# Patient Record
Sex: FEMALE | Race: WHITE | URBAN - METROPOLITAN AREA
[De-identification: names, ages, dates, MRNs, and addresses within clinical notes are randomized per-mention and may not be internally consistent; named-entity substitution may affect disease eponyms.]

---

## 2021-12-01 NOTE — TELEPHONE ENCOUNTER
Patient scheduled 12/22 with Dr Yvonne Calhoun (previous patient). She left a message regarding a fax she had sent over for a Progesterone prescription refill / Caden Ley (faxed received placed in bin at ). Please advise, Thank you!

## 2021-12-02 NOTE — TELEPHONE ENCOUNTER
I am ok with filling the progesterone. Please provide the dose, quantity and instructions. If needed reach out to [facility]. Let me know when this is done.

## 2021-12-22 ENCOUNTER — OFFICE VISIT (OUTPATIENT)
Dept: INTEGRATIVE MEDICINE | Facility: CLINIC | Age: 66
End: 2021-12-22
Payer: MEDICARE

## 2021-12-22 VITALS
BODY MASS INDEX: 27.79 KG/M2 | WEIGHT: 151 LBS | DIASTOLIC BLOOD PRESSURE: 68 MMHG | OXYGEN SATURATION: 95 % | HEIGHT: 62 IN | SYSTOLIC BLOOD PRESSURE: 122 MMHG | HEART RATE: 74 BPM

## 2021-12-22 DIAGNOSIS — Z78.0 MENOPAUSE: Primary | ICD-10-CM

## 2021-12-22 PROCEDURE — 99214 OFFICE O/P EST MOD 30 MIN: CPT | Performed by: FAMILY MEDICINE

## 2021-12-22 RX ORDER — ESTRADIOL 0.1 MG/G
1 CREAM VAGINAL DAILY
COMMUNITY
Start: 2021-07-21

## 2021-12-22 RX ORDER — UBIDECARENONE 10 MG
100 CAPSULE ORAL DAILY
COMMUNITY

## 2021-12-22 RX ORDER — CYCLOBENZAPRINE HCL 10 MG
1 TABLET ORAL EVERY 8 HOURS PRN
COMMUNITY
Start: 2019-11-22

## 2021-12-22 RX ORDER — ATORVASTATIN CALCIUM 10 MG/1
1 TABLET, FILM COATED ORAL DAILY
COMMUNITY
Start: 2021-12-08

## 2021-12-22 NOTE — PATIENT INSTRUCTIONS
BHRT:     Step down dose:   E2/E3/Testosterone 0.5mg/0.5 mg/2 mg/ml - 0.5 ml per day   Progesterone 100 mg SR nighlty     Supplement/Nutrient Support:    Estro Vive       Estro Vive - May use as needed with hormone taper   Dose: Take 2 capsule daily Additional Information:               Hormone Therapy for Women  Hormone therapy (HT) increases the levels of the hormones estrogen and progesterone in your body. This can help reduce symptoms of menopause.  HT may also help prevent osteoporosis muscles in your back or legs  · Sudden pain in your legs or chest  · Shortness of breath  [employer] last reviewed this educational content on 2/1/2020  © 1881-3632 [employer]. All rights reserved.  This information is not intended as a substit

## 2021-12-22 NOTE — PROGRESS NOTES
Rehan Persaud is a 73year old female.   Patient presents with:  Establish Care: previous pt  - discuss bioidenticals        HPI:   Here to establish care  Exercising 5 days a week and feeling better overall   Wishes to preform diet for \"Eat Fat get T Diagnosis Date   • Depression 2007    Resolved       CURRENT MEDICATIONS:     Current Outpatient Medications   Medication Sig Dispense Refill   • atorvastatin 10 MG Oral Tab Take 1 tablet by mouth daily.      • Cholecalciferol 50 MCG (2000 UT) Oral Tab Ta file  Stress: Not on file  Social Connections: Not on file  Intimate Partner Violence: Not on file  Housing Stability: Not on file    SURGICAL HISTORY:     Past Surgical History:   Procedure Laterality Date   • Adenoidectomy  1968   • Cholecystectomy  12/2 dose:  E2/E3/testosterone 0.5 mg / 0.5 mg/2 mg/milliliter–0.5 mL daily for 1 month  Progesterone 100 mg SR nightly for 1 month  Estrovive PRN if experiencing hot flashes with taper    May start eat fat get thin diet by Dr. Lakisha Aguirre     Given further recommend limited to its efficacy, benefits or outcomes. Patient agrees to contact his/her healthcare professional and stop use of Products should any reactions arise.       Diet Recommendations:     Eat Fat Get Thin Protocol       Lifestyle Recommendations:     Rec Follow-up visits  Have regular visits with a healthcare provider. These visits are a way to fine-tune your therapy. You can also be checked for any problems that might require you to stop HT.      When to call your healthcare provider  Call your healthcar

## 2022-01-03 ENCOUNTER — TELEPHONE (OUTPATIENT)
Dept: INTEGRATIVE MEDICINE | Facility: CLINIC | Age: 67
End: 2022-01-03

## 2022-01-03 DIAGNOSIS — E78.5 HYPERLIPIDEMIA, UNSPECIFIED HYPERLIPIDEMIA TYPE: Primary | ICD-10-CM

## 2022-01-03 NOTE — TELEPHONE ENCOUNTER
Patient attempting to complete the CRP Lab - Medicare stated they would not cover with current diagnosis code, wondering if there was another code available.

## 2022-01-11 ENCOUNTER — TELEPHONE (OUTPATIENT)
Dept: INTEGRATIVE MEDICINE | Facility: CLINIC | Age: 67
End: 2022-01-11

## 2022-01-11 NOTE — TELEPHONE ENCOUNTER
Jose Manuel pt's lab results from: [address] [place] completed: 1/8/2022  Ordered: 1/3/2022  Due: 1/3/2022  Report placed on provider's desk for review.

## 2022-01-14 ENCOUNTER — MED REC SCAN ONLY (OUTPATIENT)
Dept: INTEGRATIVE MEDICINE | Facility: CLINIC | Age: 67
End: 2022-01-14

## 2022-02-17 ENCOUNTER — TELEPHONE (OUTPATIENT)
Dept: INTEGRATIVE MEDICINE | Facility: CLINIC | Age: 67
End: 2022-02-17

## 2022-02-17 RX ORDER — PROGESTERONE 100 MG/1
100 CAPSULE ORAL NIGHTLY
Qty: 30 CAPSULE | Refills: 2 | Status: SHIPPED | OUTPATIENT
Start: 2022-02-17

## 2022-02-17 NOTE — TELEPHONE ENCOUNTER
Patient is inquiring about her progesterone medication, she would like it to be filled at [address], [address] She is also going out of town and will need multiple refills.  Please advise, Thank you

## 2022-02-21 NOTE — TELEPHONE ENCOUNTER
Pt called to follow up and also sent MyChart msg on the status of E2E3 Testosterone cream. Pt stated she requested the refill 2 weeks ago. Need to hear back today.

## 2022-03-31 ENCOUNTER — OFFICE VISIT (OUTPATIENT)
Dept: INTEGRATIVE MEDICINE | Facility: CLINIC | Age: 67
End: 2022-03-31
Payer: MEDICARE

## 2022-03-31 VITALS
WEIGHT: 138.19 LBS | DIASTOLIC BLOOD PRESSURE: 68 MMHG | HEIGHT: 62 IN | HEART RATE: 78 BPM | BODY MASS INDEX: 25.43 KG/M2 | OXYGEN SATURATION: 97 % | SYSTOLIC BLOOD PRESSURE: 124 MMHG

## 2022-03-31 DIAGNOSIS — Z78.0 MENOPAUSE: Primary | ICD-10-CM

## 2022-03-31 DIAGNOSIS — Z00.00 WELL ADULT EXAM: ICD-10-CM

## 2022-03-31 DIAGNOSIS — R79.82 ELEVATED C-REACTIVE PROTEIN (CRP): ICD-10-CM

## 2022-03-31 DIAGNOSIS — E78.5 HYPERLIPIDEMIA, UNSPECIFIED HYPERLIPIDEMIA TYPE: ICD-10-CM

## 2022-03-31 PROCEDURE — 99214 OFFICE O/P EST MOD 30 MIN: CPT | Performed by: FAMILY MEDICINE

## 2022-07-12 RX ORDER — PROGESTERONE 100 MG/1
CAPSULE ORAL
Qty: 30 CAPSULE | Refills: 0 | Status: SHIPPED | OUTPATIENT
Start: 2022-07-12

## 2022-08-22 ENCOUNTER — TELEPHONE (OUTPATIENT)
Dept: INTEGRATIVE MEDICINE | Facility: CLINIC | Age: 67
End: 2022-08-22

## 2022-08-22 ENCOUNTER — MED REC SCAN ONLY (OUTPATIENT)
Dept: INTEGRATIVE MEDICINE | Facility: CLINIC | Age: 67
End: 2022-08-22

## 2022-08-22 PROBLEM — N95.2 VAGINAL ATROPHY: Status: ACTIVE | Noted: 2019-07-18

## 2022-08-22 PROBLEM — R94.31 ABNORMAL EKG: Status: ACTIVE | Noted: 2020-09-03

## 2022-08-22 PROBLEM — E78.49 OTHER HYPERLIPIDEMIA: Status: ACTIVE | Noted: 2018-04-06

## 2022-08-22 RX ORDER — TESTOSTERONE MICRONIZED 100 %
POWDER (GRAM) MISCELLANEOUS
COMMUNITY
Start: 2022-08-12

## 2022-08-22 NOTE — TELEPHONE ENCOUNTER
Patient contacted clinic regarding mammogram, showed area of suspicion, had this completed at [facility]. They will be faxing over results, advised she will need to also obtain a 'special view of mammogram' and 'ultrasound right breast'. She will be travelling out of town in 2 weeks, wondering if this can be ordered prior to.     Thank you

## 2022-08-25 RX ORDER — PROGESTERONE 100 MG/1
CAPSULE ORAL
Qty: 30 CAPSULE | Refills: 0 | Status: SHIPPED | OUTPATIENT
Start: 2022-08-25

## 2022-09-17 ENCOUNTER — PATIENT MESSAGE (OUTPATIENT)
Dept: INTEGRATIVE MEDICINE | Facility: CLINIC | Age: 67
End: 2022-09-17

## 2022-09-19 NOTE — TELEPHONE ENCOUNTER
Pt sending Ally chavez re: lab orders not being sent to [facility] yet for her lab draw that is occurring tomorrow 9/20/22.     Lab orders (CMP, CBC, Lipids, TSH/Reflex T4, CRP) faxed this am to [facility] ([phone])

## 2022-09-20 ENCOUNTER — OFFICE VISIT (OUTPATIENT)
Dept: INTEGRATIVE MEDICINE | Facility: CLINIC | Age: 67
End: 2022-09-20

## 2022-09-20 VITALS
SYSTOLIC BLOOD PRESSURE: 112 MMHG | DIASTOLIC BLOOD PRESSURE: 62 MMHG | HEART RATE: 79 BPM | OXYGEN SATURATION: 96 % | BODY MASS INDEX: 24 KG/M2 | WEIGHT: 130 LBS

## 2022-09-20 DIAGNOSIS — E78.49 OTHER HYPERLIPIDEMIA: ICD-10-CM

## 2022-09-20 DIAGNOSIS — N95.2 VAGINAL ATROPHY: ICD-10-CM

## 2022-09-20 DIAGNOSIS — F32.A DEPRESSION, UNSPECIFIED DEPRESSION TYPE: ICD-10-CM

## 2022-09-20 DIAGNOSIS — M85.89 OSTEOPENIA OF MULTIPLE SITES: ICD-10-CM

## 2022-09-20 DIAGNOSIS — R94.31 ABNORMAL EKG: ICD-10-CM

## 2022-09-20 DIAGNOSIS — Z00.00 ENCOUNTER FOR ANNUAL HEALTH EXAMINATION: Primary | ICD-10-CM

## 2022-09-20 PROCEDURE — G0438 PPPS, INITIAL VISIT: HCPCS | Performed by: FAMILY MEDICINE

## 2022-09-20 RX ORDER — TRIAMCINOLONE ACETONIDE 1 MG/G
CREAM TOPICAL
COMMUNITY
Start: 2022-08-31

## 2022-09-26 ENCOUNTER — MED REC SCAN ONLY (OUTPATIENT)
Dept: INTEGRATIVE MEDICINE | Facility: CLINIC | Age: 67
End: 2022-09-26

## 2022-09-26 ENCOUNTER — TELEPHONE (OUTPATIENT)
Dept: INTEGRATIVE MEDICINE | Facility: CLINIC | Age: 67
End: 2022-09-26

## 2022-09-28 RX ORDER — PROGESTERONE 100 MG/1
CAPSULE ORAL
Qty: 30 CAPSULE | Refills: 0 | Status: SHIPPED | OUTPATIENT
Start: 2022-09-28

## 2022-10-28 ENCOUNTER — TELEPHONE (OUTPATIENT)
Dept: INTEGRATIVE MEDICINE | Facility: CLINIC | Age: 67
End: 2022-10-28

## 2022-10-28 NOTE — TELEPHONE ENCOUNTER
Pt called again to request refill for Progesterone 100 MG. Pt states she will be out of town next week but if possible she will call Monday 10/31 to give pharmacy details in [id number] [facility] so Rx can be sent to the pharmacy there so that way she does not have to be without taking her med.

## 2022-10-31 ENCOUNTER — TELEPHONE (OUTPATIENT)
Dept: INTEGRATIVE MEDICINE | Facility: CLINIC | Age: 67
End: 2022-10-31

## 2022-10-31 NOTE — TELEPHONE ENCOUNTER
Patient is now in [place] and is out of medication. Please submit prescription to the following pharmacy; [facility]#[phone]  FAX#?      Thank you

## 2022-11-02 RX ORDER — PROGESTERONE 100 MG/1
100 CAPSULE ORAL NIGHTLY
Qty: 30 CAPSULE | Refills: 0 | Status: SHIPPED | OUTPATIENT
Start: 2022-11-02

## 2022-11-04 NOTE — TELEPHONE ENCOUNTER
Please advise patient about medication dosage after [place] test results. Patient has an appointment next March, sooner than next opening, please advise if that appointment is ok for follow up.

## 2022-12-09 RX ORDER — ESTRADIOL 0.1 MG/G
1 CREAM VAGINAL DAILY
Qty: 42.5 G | Refills: 0 | Status: SHIPPED | OUTPATIENT
Start: 2022-12-09

## 2023-01-11 RX ORDER — ATORVASTATIN CALCIUM 10 MG/1
10 TABLET, FILM COATED ORAL DAILY
Qty: 90 TABLET | Refills: 0 | Status: SHIPPED | OUTPATIENT
Start: 2023-01-11

## 2023-02-25 NOTE — TELEPHONE ENCOUNTER
Patient is requesting refill on Progesterone 100 mg. Patient's last refill was on 11/29/2022 and last office visit was on 9/22/2022. Did not pass protocol. Please advise.

## 2023-02-27 RX ORDER — PROGESTERONE 100 MG/1
CAPSULE ORAL
Qty: 30 CAPSULE | Refills: 0 | Status: SHIPPED | OUTPATIENT
Start: 2023-02-27

## 2023-03-06 ENCOUNTER — OFFICE VISIT (OUTPATIENT)
Dept: INTEGRATIVE MEDICINE | Facility: CLINIC | Age: 68
End: 2023-03-06
Payer: MEDICARE

## 2023-03-06 VITALS
OXYGEN SATURATION: 99 % | HEART RATE: 63 BPM | SYSTOLIC BLOOD PRESSURE: 100 MMHG | DIASTOLIC BLOOD PRESSURE: 70 MMHG | BODY MASS INDEX: 24 KG/M2 | WEIGHT: 129.63 LBS

## 2023-03-06 DIAGNOSIS — Z79.890 HORMONE REPLACEMENT THERAPY: ICD-10-CM

## 2023-03-06 DIAGNOSIS — Z78.0 MENOPAUSE: ICD-10-CM

## 2023-03-06 DIAGNOSIS — M77.8 RIGHT SHOULDER TENDONITIS: Primary | ICD-10-CM

## 2023-03-06 DIAGNOSIS — N91.2 AMENORRHEA: ICD-10-CM

## 2023-03-06 PROCEDURE — 99213 OFFICE O/P EST LOW 20 MIN: CPT | Performed by: FAMILY MEDICINE

## 2023-04-27 ENCOUNTER — PATIENT MESSAGE (OUTPATIENT)
Dept: INTEGRATIVE MEDICINE | Facility: CLINIC | Age: 68
End: 2023-04-27

## 2023-04-28 NOTE — TELEPHONE ENCOUNTER
RN s/w patient. Patient upset- states she has not been able to get refill of Sig: E2/E3/Testosterone 0.5 mg/0.5mg/2mg/ml   Dose: Take 0.5 ml daily for 1 month. RN spoke with Carissa Caldera, [address] [place] [address] per Carissa Caldera, script was faxed to [facility] yesterday. Patient is very upset, states this is \"unacceptable medical care\"     RN advised patient that she can call the RN triage line at the office  if any refill is needed urgently for the future. Patient would like to know if there are any special considerations of being off of hormone cream/implications? Will forward to Dr. Krupa Arboleda.

## 2023-05-22 ENCOUNTER — MED REC SCAN ONLY (OUTPATIENT)
Dept: INTEGRATIVE MEDICINE | Facility: CLINIC | Age: 68
End: 2023-05-22

## 2023-06-15 NOTE — TELEPHONE ENCOUNTER
A refill request was received for:  Requested Prescriptions     Pending Prescriptions Disp Refills    PROGESTERONE 100 MG Oral Cap [Pharmacy Med Name: Progesterone 100 Mg Cap Nort] 30 capsule 0     Sig: Take 1 capsule (100 mg total) by mouth every evening.      Last refill date:  04/20/2023  Qty: 30  Dx:   Last office visit: 03/06/2023   When is follow up due: 09/06/2023 6 months per last OV      For hormone prescriptions, date of last blood test for rx being requested:    Future Appointments   Date Time Provider Bhanu Brooke   9/7/2023  2:30 PM Vanessa Alvarez [facility] [place]

## 2023-06-16 RX ORDER — PROGESTERONE 100 MG/1
100 CAPSULE ORAL EVERY EVENING
Qty: 30 CAPSULE | Refills: 0 | Status: SHIPPED | OUTPATIENT
Start: 2023-06-16

## 2023-06-21 ENCOUNTER — MED REC SCAN ONLY (OUTPATIENT)
Dept: INTEGRATIVE MEDICINE | Facility: CLINIC | Age: 68
End: 2023-06-21

## 2023-07-17 ENCOUNTER — PATIENT MESSAGE (OUTPATIENT)
Dept: INTEGRATIVE MEDICINE | Facility: CLINIC | Age: 68
End: 2023-07-17

## 2023-07-25 RX ORDER — PROGESTERONE 100 MG/1
100 CAPSULE ORAL EVERY EVENING
Qty: 30 CAPSULE | Refills: 0 | Status: SHIPPED | OUTPATIENT
Start: 2023-07-25

## 2023-07-25 RX ORDER — ESTRADIOL 0.1 MG/G
1 CREAM VAGINAL DAILY
Qty: 42.5 G | Refills: 0 | Status: SHIPPED | OUTPATIENT
Start: 2023-07-25

## 2023-08-07 ENCOUNTER — PATIENT MESSAGE (OUTPATIENT)
Dept: INTEGRATIVE MEDICINE | Facility: CLINIC | Age: 68
End: 2023-08-07

## 2023-08-08 NOTE — TELEPHONE ENCOUNTER
From: Bre Shannon  To: Coni Spurling, DO  Sent: 8/7/2023 2:41 PM CDT  Subject: Pre op visit 9/7    I will need an EKG for pre op clearance. Will that be done in your office at my visit 9/7 or do I need an order to have it done somewhere else?

## 2023-08-08 NOTE — TELEPHONE ENCOUNTER
S/w Rajwinder Cooley from Dr. Hernandez Deutsch office requesting fax of pre operative clearance requirements for appointment. Office fax number given.

## 2023-08-08 NOTE — TELEPHONE ENCOUNTER
Received call from [facility] of Dr. Brittany Sawant office confirming office fax and will fax pre operative clearance requirements. She cautioned the pt cannot have testing > 30 days prior to scheduled surgery on 9/18/23.

## 2023-08-22 ENCOUNTER — MED REC SCAN ONLY (OUTPATIENT)
Dept: INTEGRATIVE MEDICINE | Facility: CLINIC | Age: 68
End: 2023-08-22

## 2023-08-24 ENCOUNTER — TELEPHONE (OUTPATIENT)
Dept: INTEGRATIVE MEDICINE | Facility: CLINIC | Age: 68
End: 2023-08-24

## 2023-08-24 NOTE — TELEPHONE ENCOUNTER
[facility], [address] [place] [address]  [facility]#[phone]    Please fax labs / Completed by PSR.      Thank you

## 2023-08-30 RX ORDER — PROGESTERONE 100 MG/1
100 CAPSULE ORAL EVERY EVENING
Qty: 30 CAPSULE | Refills: 0 | Status: SHIPPED | OUTPATIENT
Start: 2023-08-30

## 2023-08-30 RX ORDER — ATORVASTATIN CALCIUM 10 MG/1
10 TABLET, FILM COATED ORAL DAILY
Qty: 90 TABLET | Refills: 0 | Status: SHIPPED | OUTPATIENT
Start: 2023-08-30

## 2023-09-06 RX ORDER — ATORVASTATIN CALCIUM 10 MG/1
10 TABLET, FILM COATED ORAL DAILY
Qty: 90 TABLET | Refills: 0 | OUTPATIENT
Start: 2023-09-06

## 2023-09-06 RX ORDER — ESTRADIOL 0.1 MG/G
CREAM VAGINAL
Qty: 42.5 G | Refills: 0 | OUTPATIENT
Start: 2023-09-06

## 2023-09-06 RX ORDER — PROGESTERONE 100 MG/1
100 CAPSULE ORAL EVERY EVENING
Qty: 30 CAPSULE | Refills: 0 | OUTPATIENT
Start: 2023-09-06

## 2023-09-07 ENCOUNTER — OFFICE VISIT (OUTPATIENT)
Dept: INTEGRATIVE MEDICINE | Facility: CLINIC | Age: 68
End: 2023-09-07
Payer: MEDICARE

## 2023-09-07 VITALS
WEIGHT: 131.63 LBS | HEART RATE: 70 BPM | SYSTOLIC BLOOD PRESSURE: 130 MMHG | BODY MASS INDEX: 24 KG/M2 | OXYGEN SATURATION: 98 % | DIASTOLIC BLOOD PRESSURE: 72 MMHG

## 2023-09-07 DIAGNOSIS — Z01.810 PREOP CARDIOVASCULAR EXAM: Primary | ICD-10-CM

## 2023-09-07 DIAGNOSIS — Z79.890 HORMONE REPLACEMENT THERAPY: ICD-10-CM

## 2023-09-07 DIAGNOSIS — Z01.818 PREOP EXAMINATION: ICD-10-CM

## 2023-09-07 PROBLEM — R94.31 ELECTROCARDIOGRAM ABNORMAL: Status: ACTIVE | Noted: 2020-09-03

## 2023-09-07 LAB
ATRIAL RATE: 62 BPM
P AXIS: 46 DEGREES
P-R INTERVAL: 190 MS
Q-T INTERVAL: 408 MS
QRS DURATION: 76 MS
QTC CALCULATION (BEZET): 414 MS
R AXIS: 45 DEGREES
T AXIS: 67 DEGREES
VENTRICULAR RATE: 62 BPM

## 2023-09-07 PROCEDURE — 99214 OFFICE O/P EST MOD 30 MIN: CPT | Performed by: FAMILY MEDICINE

## 2023-09-07 PROCEDURE — 93000 ELECTROCARDIOGRAM COMPLETE: CPT | Performed by: FAMILY MEDICINE

## 2023-09-07 RX ORDER — TACROLIMUS 1 MG/G
OINTMENT TOPICAL
COMMUNITY
Start: 2023-08-31

## 2023-09-07 RX ORDER — TESTOSTERONE MICRONIZED 100 %
POWDER (GRAM) MISCELLANEOUS
COMMUNITY
Start: 2023-07-24

## 2023-09-07 RX ORDER — ALPRAZOLAM 0.5 MG/1
TABLET ORAL
COMMUNITY
Start: 2021-02-19

## 2023-10-02 RX ORDER — PROGESTERONE 100 MG/1
100 CAPSULE ORAL EVERY EVENING
Qty: 30 CAPSULE | Refills: 0 | Status: SHIPPED | OUTPATIENT
Start: 2023-10-02

## 2023-11-07 RX ORDER — PROGESTERONE 100 MG/1
100 CAPSULE ORAL EVERY EVENING
Qty: 30 CAPSULE | Refills: 0 | Status: SHIPPED | OUTPATIENT
Start: 2023-11-07

## 2023-11-13 NOTE — TELEPHONE ENCOUNTER
A refill request was received for:  Requested Prescriptions     Pending Prescriptions Disp Refills    CUSTOM MEDICATION 60 each 0     Sig: E2/E3/Testosterone 0.5 mg/0.5mg/2mg/ml   Dose: Take 0.5 ml daily for 1 month     Last refill date:  5/5/23   Qty: 60 each  Dx: Lizzy Sanon   Last office visit: 9/7/23

## 2023-11-30 RX ORDER — ATORVASTATIN CALCIUM 10 MG/1
10 TABLET, FILM COATED ORAL DAILY
Qty: 90 TABLET | Refills: 0 | Status: SHIPPED | OUTPATIENT
Start: 2023-11-30

## 2024-01-10 ENCOUNTER — TELEPHONE (OUTPATIENT)
Dept: INTEGRATIVE MEDICINE | Facility: CLINIC | Age: 69
End: 2024-01-10

## 2024-03-20 ENCOUNTER — PATIENT OUTREACH (OUTPATIENT)
Dept: CASE MANAGEMENT | Age: 69
End: 2024-03-20

## 2024-03-20 NOTE — PROCEDURES
The office order for PCP removal request is Approved and finalized on March 20, 2024.    Thanks,  [facility] Team

## 2024-04-01 ENCOUNTER — TELEPHONE (OUTPATIENT)
Dept: FAMILY MEDICINE CLINIC | Facility: CLINIC | Age: 69
End: 2024-04-01